# Patient Record
Sex: FEMALE | Race: WHITE
[De-identification: names, ages, dates, MRNs, and addresses within clinical notes are randomized per-mention and may not be internally consistent; named-entity substitution may affect disease eponyms.]

---

## 2018-07-29 ENCOUNTER — HOSPITAL ENCOUNTER (EMERGENCY)
Dept: HOSPITAL 45 - C.EDB | Age: 83
Discharge: HOME | End: 2018-07-29
Payer: COMMERCIAL

## 2018-07-29 VITALS — OXYGEN SATURATION: 96 % | DIASTOLIC BLOOD PRESSURE: 75 MMHG | HEART RATE: 83 BPM | SYSTOLIC BLOOD PRESSURE: 137 MMHG

## 2018-07-29 VITALS
WEIGHT: 153.22 LBS | HEIGHT: 62.01 IN | WEIGHT: 153.22 LBS | HEIGHT: 62.01 IN | BODY MASS INDEX: 27.84 KG/M2 | BODY MASS INDEX: 27.84 KG/M2

## 2018-07-29 VITALS — TEMPERATURE: 98.24 F

## 2018-07-29 DIAGNOSIS — R60.0: ICD-10-CM

## 2018-07-29 DIAGNOSIS — Z88.2: ICD-10-CM

## 2018-07-29 DIAGNOSIS — I82.412: Primary | ICD-10-CM

## 2018-07-29 DIAGNOSIS — Z88.1: ICD-10-CM

## 2018-07-29 DIAGNOSIS — Z79.899: ICD-10-CM

## 2018-07-29 DIAGNOSIS — I10: ICD-10-CM

## 2018-07-29 LAB
BASOPHILS # BLD: 0.02 K/UL (ref 0–0.2)
BASOPHILS NFR BLD: 0.2 %
BUN SERPL-MCNC: 26 MG/DL (ref 7–18)
CALCIUM SERPL-MCNC: 8.8 MG/DL (ref 8.5–10.1)
CO2 SERPL-SCNC: 23 MMOL/L (ref 21–32)
CREAT SERPL-MCNC: 1.04 MG/DL (ref 0.6–1.2)
EOS ABS #: 0.08 K/UL (ref 0–0.5)
EOSINOPHIL NFR BLD AUTO: 148 K/UL (ref 130–400)
GLUCOSE SERPL-MCNC: 118 MG/DL (ref 70–99)
HCT VFR BLD CALC: 39.6 % (ref 37–47)
HGB BLD-MCNC: 13.4 G/DL (ref 12–16)
IG#: 0.01 K/UL (ref 0–0.02)
IMM GRANULOCYTES NFR BLD AUTO: 15.2 %
INR PPP: 1 (ref 0.9–1.1)
LYMPHOCYTES # BLD: 1.34 K/UL (ref 1.2–3.4)
MCH RBC QN AUTO: 31.5 PG (ref 25–34)
MCHC RBC AUTO-ENTMCNC: 33.8 G/DL (ref 32–36)
MCV RBC AUTO: 93.2 FL (ref 80–100)
MONO ABS #: 0.96 K/UL (ref 0.11–0.59)
MONOCYTES NFR BLD: 10.9 %
NEUT ABS #: 6.38 K/UL (ref 1.4–6.5)
NEUTROPHILS # BLD AUTO: 0.9 %
NEUTROPHILS NFR BLD AUTO: 72.7 %
PMV BLD AUTO: 11 FL (ref 7.4–10.4)
POTASSIUM SERPL-SCNC: 4.3 MMOL/L (ref 3.5–5.1)
PTT PATIENT: 25.1 SECONDS (ref 21–31)
RED CELL DISTRIBUTION WIDTH CV: 12.7 % (ref 11.5–14.5)
RED CELL DISTRIBUTION WIDTH SD: 43.2 FL (ref 36.4–46.3)
SODIUM SERPL-SCNC: 138 MMOL/L (ref 136–145)
WBC # BLD AUTO: 8.79 K/UL (ref 4.8–10.8)

## 2018-07-29 NOTE — EMERGENCY ROOM VISIT NOTE
History


Report prepared by Santiago:  Karen Cooper


Under the Supervision of:  Dr. Leo Hathaway M.D.


First contact with patient:  16:01


Chief Complaint:  LEG PAIN,LEG INJURY


Stated Complaint:  LEG PAIN, LEFT POSSIBE DVT REFERRED BY DR Tapia of Present Illness


The patient is a 85 year old white female with a past medical history of HTN 

who presents to the ED with a cc of L leg swelling beginning 2 days ago. She 

notes her leg was fine when she went to work 2 days ago, and was red and 

swollen by the time her shift was over. The patient notes the area is hot to 

the touch, but denies any pain. She denies any numbness, fevers, chills, SOB, 

or chest pain. The patient denies any recent tracel or injuries. She notes she 

has no history of blood clots and that she does not smoke. The patient denies 

any hormone therapy and notes she only takes medicine for HTN. She notes she 

still works full-time as a  and wears support pantyhose while on her 

feet at work. The patient reports she saw Dr. Seferino marquez, 

who referred her to the ED.





   Source of History:  patient


   Onset:  2 days ago


   Position:  leg (left)





Review of Systems


See HPI for pertinent positives and negatives.  A total of ten systems were 

reviewed and were otherwise negative.





Past Medical & Surgical


Medical Problems:


(1) HTN (hypertension)








Family History





FHx: cancer


FHx: diabetes mellitus


FHx: gallbladder disease


FHx: heart disease


FHx: hypertension





Social History


Smoking Status:  Never Smoker


Smokeless Tobacco Use:  No


Alcohol Use:  none


Drug Use:  none


Housing Status:  lives with family


Occupation Status:  employed





Current/Historical Medications


Scheduled


Lisinopril (Zestril), 20 MG PO DAILY


Rivaroxaban (Xarelto), 1 TAB PO BID


Rivaroxaban (Xarelto), 1 TAB PO QD





Allergies


Coded Allergies:  


     Sulfa Antibiotics (Verified  Allergy, Severe, "SEVERE GI UPSET", 7/29/18)


     Aminoglycosides (Verified  Allergy, Mild, 7/29/18)


     Bacitracin (Verified  Allergy, Mild, 7/29/18)


     Neomycin (Verified  Allergy, Mild, 7/29/18)


     Polymyxin B (Verified  Allergy, Mild, 7/29/18)





Physical Exam


Vital Signs











  Date Time  Temp Pulse Resp B/P (MAP) Pulse Ox O2 Delivery O2 Flow Rate FiO2


 


7/29/18 17:50  83 20 137/75 96 Room Air  


 


7/29/18 15:58 36.8 101 17 146/72 97 Room Air  











Physical Exam


GENERAL: Awake, alert, well-appearing, NAD


HENT: Normocephalic, atraumatic.


EYES: Normal conjunctiva. Sclera non-icteric. PERRL. No anisocoria.


NECK: Supple. No nuchal rigidity. FROM.


RESPIRATORY: CTAB, no rhonchi, wheezing, crackles


CARDIAC: RRR, no MRG


ABDOMEN: Soft, NTND, BS+


MSK: No chest wall TTP. Erythematous, warm, swollen LLE from knee down. 

Negative daxa's. No calf pain. No masses palpated in posterior fossa of L 

knee. Good DP pulses.


NEURO: GCS 15, CN 2-12 intact, moves all 4s on command


SKIN: No rash or jaundice noted.





Medical Decision & Procedures


ER Provider


Diagnostic Interpretation:


Radiology results as stated below per my review and radiologist interpretation: 





L VENOUS DOPP LOWER EXT UNILAT





CLINICAL HISTORY: LLE swelling, calf swelling/redness, no pain, neg daxa's sign





TECHNIQUE: Venous Doppler





COMPARISON STUDY:  None





FINDINGS: Occlusive thrombus left common femoral vein to calf veins. 12.


Anterior tibial veins is positive for deep venous thrombosis. Thrombus is also


seen within the greater saphenous vein.





IMPRESSION:  


1. Acute deep venous thrombosis from the left common femoral vein through the


calf.


2. Mild superficial thrombophlebitis 





The above report was generated using voice recognition software.  It may contain


grammatical, syntax or spelling errors.





Electronically signed by:  Kaveh Cordero M.D.


7/29/2018 5:18 PM





Dictated Date/Time:  7/29/2018 5:17 PM





Laboratory Results


7/29/18 16:25








Red Blood Count 4.25, Mean Corpuscular Volume 93.2, Mean Corpuscular Hemoglobin 

31.5, Mean Corpuscular Hemoglobin Concent 33.8, Mean Platelet Volume 11.0, 

Neutrophils (%) (Auto) 72.7, Lymphocytes (%) (Auto) 15.2, Monocytes (%) (Auto) 

10.9, Eosinophils (%) (Auto) 0.9, Basophils (%) (Auto) 0.2, Neutrophils # (Auto

) 6.38, Lymphocytes # (Auto) 1.34, Monocytes # (Auto) 0.96, Eosinophils # (Auto

) 0.08, Basophils # (Auto) 0.02





7/29/18 16:25

















Test


  7/29/18


16:25


 


White Blood Count


  8.79 K/uL


(4.8-10.8)


 


Red Blood Count


  4.25 M/uL


(4.2-5.4)


 


Hemoglobin


  13.4 g/dL


(12.0-16.0)


 


Hematocrit 39.6 % (37-47) 


 


Mean Corpuscular Volume


  93.2 fL


()


 


Mean Corpuscular Hemoglobin


  31.5 pg


(25-34)


 


Mean Corpuscular Hemoglobin


Concent 33.8 g/dl


(32-36)


 


Platelet Count


  148 K/uL


(130-400)


 


Mean Platelet Volume


  11.0 fL


(7.4-10.4)


 


Neutrophils (%) (Auto) 72.7 % 


 


Lymphocytes (%) (Auto) 15.2 % 


 


Monocytes (%) (Auto) 10.9 % 


 


Eosinophils (%) (Auto) 0.9 % 


 


Basophils (%) (Auto) 0.2 % 


 


Neutrophils # (Auto)


  6.38 K/uL


(1.4-6.5)


 


Lymphocytes # (Auto)


  1.34 K/uL


(1.2-3.4)


 


Monocytes # (Auto)


  0.96 K/uL


(0.11-0.59)


 


Eosinophils # (Auto)


  0.08 K/uL


(0-0.5)


 


Basophils # (Auto)


  0.02 K/uL


(0-0.2)


 


RDW Standard Deviation


  43.2 fL


(36.4-46.3)


 


RDW Coefficient of Variation


  12.7 %


(11.5-14.5)


 


Immature Granulocyte % (Auto) 0.1 % 


 


Immature Granulocyte # (Auto)


  0.01 K/uL


(0.00-0.02)


 


Prothrombin Time


  10.1 SECONDS


(9.0-12.0)


 


Prothromb Time International


Ratio 1.0 (0.9-1.1) 


 


 


Activated Partial


Thromboplast Time 25.1 SECONDS


(21.0-31.0)


 


Partial Thromboplastin Ratio 1.0 


 


Anion Gap


  7.0 mmol/L


(3-11)


 


Est Creatinine Clear Calc


Drug Dose 36.1 ml/min 


 


 


Estimated GFR (


American) 56.7 


 


 


Estimated GFR (Non-


American 49.0 


 


 


BUN/Creatinine Ratio 25.3 (10-20) 


 


Calcium Level


  8.8 mg/dl


(8.5-10.1)





Laboratory results reviewed by me





Medications Administered











 Medications


  (Trade)  Dose


 Ordered  Sig/[facility]


 Route  Start Time


 Stop Time Status Last Admin


Dose Admin


 


 Rivaroxaban


  (Xarelto Tab)  15 mg  DAILY


 PO  7/30/18 09:00


 7/30/18 09:00 DC 7/29/18 18:12


15 MG











ED Course


1603: The patient was evaluated in room B2. A complete history and physical 

exam was performed.





1758: I reevaluated the patient. Discussed results and discharge instructions: 

she verbalized understanding and agreement. The patient is ready for discharge.





Medical Decision


Nursing notes reviewed. Ancillary studies and prior records reviewed. 





The patient is a 85 year old white female with a past medical history of HTN 

who presents to the ED with a cc of L leg swelling beginning 2 days ago. 





Etiologies such as DVT, musculoskeletal, infection, joint effusion, trauma, 

lymphedema, idiopathic, CHF, as well as others were entertained.





Patient was seen and evaluated the bedside.  Patient did note some redness and 

swelling to her left, Friday.  It has been fairly constant.  Patient denies any 

hormone use, smoking, leg pain, calf pain, fever, chills, shortness of breath, 

recent car or plane travel.  Patient has no prior history of DVT or PE.





Patient is neuro vascular intact but does have a swollen left lower extremity.  

Patient had blood work completed along with a DVT ultrasound.  Patient's blood 

work fairly unremarkable.  Patient has normal white blood cell count.  Patient'

s ultrasound was concerning for DVT.  I did express the patient and family 

members.  They were told any warning symptoms were to return.  Patient was 

given a first dose of Xarelto and was given further medication for an 

outpatient as a prescription.  They were told that they will need to follow-up 

with her primary care physician for further anticoagulant medication before her 

prescription runs out.  Patient was given strict follow-up, discharge, and 

return precautions.  All questions were answered.  Patient was deemed suitable 

for outpatient follow-up at this time.  Patient agreed with the plan of care 

and was safely discharged home.





Medication Reconcilliation


Current Medication List:  was personally reviewed by me





Blood Pressure Screening


Patient's blood pressure:  Normal blood pressure


Blood pressure disposition:  Did not require urgent referral





Impression





 Primary Impression:  


 Deep vein thrombosis


 Additional Impression:  


 Leg swelling





Scribe Attestation


The scribe's documentation has been prepared under my direction and personally 

reviewed by me in its entirety. I confirm that the note above accurately 

reflects all work, treatment, procedures, and medical decision making performed 

by me.





Departure Information


Dispostion


Home / Self-Care





Prescriptions





Rivaroxaban (Xarelto) 20 Mg Tab


1 TAB PO QD for 28 Days, #28 TAB


   Prov: Leo Hathaway M.D.         7/29/18 


Rivaroxaban (Xarelto) 15 Mg Tab


1 TAB PO BID for 21 Days, #42 TAB


   Take this medication for first 3 weeks then take the 20 mg tablets


   once daily thereafter.


   Prov: Leo Hathaway M.D.         7/29/18





Referrals


Nataliia Espinal (PCP)





Forms


HOME CARE DOCUMENTATION FORM,                                                 

               IMPORTANT VISIT INFORMATION





Patient Instructions


DVT, DVT Dc, DVT Prevent, ED RICE [facility]





Additional Instructions





Please return to the emergency department if you have worsening or recurrent 

symptoms not amenable to at-home treatment.  Please call for a follow-up 

appointment with her primary care physician.  Please take your medications as 

prescribed.  If you have other concerns and/or complaints please feel free to 

also call your primary care physician's office or return the ED for further 

evaluation, management, and treatment.





You may take tylenol 650 mg every 6 hours as needed for pain/fever unless told 

by your physician to not take it or have liver problems.  Avoid NSAIDs like 

naproxen, Aleve, Advil, Motrin, ibuprofen.





Please return if you have any shortness of breath or chest pain.  Please return 

if you do H her head or do have prolonged bleeding from a possible cut.





Take your medications as prescribed. 





Make sure that you follow-up with your primary care physician within the next 1-

2 weeks.





You have been examined and treated today on an emergency basis only. This is 

not a substitute for, or an effort to provide, complete comprehensive medical 

care. It is impossible to recognize and treat all injuries or illnesses in a 

single emergency department visit. It is therefore important that you follow up 

closely with [facility], your PCP, and/or your specialist(s). 

Call as soon as possible for an appointment.





Thank you for your time and consideration. I look forward to speaking with you 

again soon. Please don't hesitate to call us if you have any questions.





Problem Qualifiers








 Primary Impression:  


 Deep vein thrombosis


 DVT location:  lower extremity  Affected thrombotic vein of extremity:  

femoral  Chronicity:  acute  Laterality:  left  Qualified Codes:  I82.412 - 

Acute embolism and thrombosis of left femoral vein

## 2018-07-29 NOTE — DIAGNOSTIC IMAGING REPORT
L VENOUS DOPP LOWER EXT UNILAT



CLINICAL HISTORY: LLE swelling, calf swelling/redness, no pain, neg daxa's sign

   



TECHNIQUE: Venous Doppler



COMPARISON STUDY:  None



FINDINGS: Occlusive thrombus left common femoral vein to calf veins. 12.

Anterior tibial veins is positive for deep venous thrombosis. Thrombus is also

seen within the greater saphenous vein.



IMPRESSION:  

1. Acute deep venous thrombosis from the left common femoral vein through the

calf.

2. Mild superficial thrombophlebitis 









The above report was generated using voice recognition software.  It may contain

grammatical, syntax or spelling errors.







Electronically signed by:  Kaveh Cordero M.D.

7/29/2018 5:18 PM



Dictated Date/Time:  7/29/2018 5:17 PM